# Patient Record
Sex: FEMALE | Race: OTHER | NOT HISPANIC OR LATINO
[De-identification: names, ages, dates, MRNs, and addresses within clinical notes are randomized per-mention and may not be internally consistent; named-entity substitution may affect disease eponyms.]

---

## 2022-09-16 ENCOUNTER — NON-APPOINTMENT (OUTPATIENT)
Age: 27
End: 2022-09-16

## 2022-09-16 ENCOUNTER — APPOINTMENT (OUTPATIENT)
Dept: CARDIOLOGY | Facility: CLINIC | Age: 27
End: 2022-09-16

## 2022-09-16 VITALS
WEIGHT: 176 LBS | BODY MASS INDEX: 30.05 KG/M2 | HEART RATE: 106 BPM | RESPIRATION RATE: 16 BRPM | DIASTOLIC BLOOD PRESSURE: 101 MMHG | SYSTOLIC BLOOD PRESSURE: 138 MMHG | HEIGHT: 64 IN | OXYGEN SATURATION: 99 %

## 2022-09-16 VITALS — DIASTOLIC BLOOD PRESSURE: 70 MMHG | SYSTOLIC BLOOD PRESSURE: 130 MMHG

## 2022-09-16 DIAGNOSIS — F41.9 ANXIETY DISORDER, UNSPECIFIED: ICD-10-CM

## 2022-09-16 DIAGNOSIS — G43.909 MIGRAINE, UNSPECIFIED, NOT INTRACTABLE, W/OUT STATUS MIGRAINOSUS: ICD-10-CM

## 2022-09-16 DIAGNOSIS — Z78.9 OTHER SPECIFIED HEALTH STATUS: ICD-10-CM

## 2022-09-16 DIAGNOSIS — R00.2 PALPITATIONS: ICD-10-CM

## 2022-09-16 DIAGNOSIS — F32.A DEPRESSION, UNSPECIFIED: ICD-10-CM

## 2022-09-16 DIAGNOSIS — F90.9 ATTENTION-DEFICIT HYPERACTIVITY DISORDER, UNSPECIFIED TYPE: ICD-10-CM

## 2022-09-16 DIAGNOSIS — R00.0 TACHYCARDIA, UNSPECIFIED: ICD-10-CM

## 2022-09-16 DIAGNOSIS — Z82.49 FAMILY HISTORY OF ISCHEMIC HEART DISEASE AND OTHER DISEASES OF THE CIRCULATORY SYSTEM: ICD-10-CM

## 2022-09-16 PROCEDURE — 99205 OFFICE O/P NEW HI 60 MIN: CPT

## 2022-09-16 PROCEDURE — 93000 ELECTROCARDIOGRAM COMPLETE: CPT

## 2022-09-16 RX ORDER — PSYLLIUM HUSK 0.4 G
CAPSULE ORAL
Refills: 0 | Status: ACTIVE | COMMUNITY

## 2022-09-16 RX ORDER — VALSARTAN 160 MG/1
160 TABLET, COATED ORAL DAILY
Refills: 0 | Status: ACTIVE | COMMUNITY

## 2022-09-16 RX ORDER — ATOGEPANT 60 MG/1
60 TABLET ORAL
Refills: 0 | Status: ACTIVE | COMMUNITY

## 2022-09-16 RX ORDER — VITAMIN B COMPLEX
CAPSULE ORAL
Refills: 0 | Status: ACTIVE | COMMUNITY

## 2022-09-16 RX ORDER — LEVOMILNACIPRAN HYDROCHLORIDE 40 MG/1
40 CAPSULE, EXTENDED RELEASE ORAL
Refills: 0 | Status: ACTIVE | COMMUNITY

## 2022-09-16 RX ORDER — CHROMIUM 200 MCG
TABLET ORAL
Refills: 0 | Status: ACTIVE | COMMUNITY

## 2022-09-16 NOTE — HISTORY OF PRESENT ILLNESS
[FreeTextEntry1] : 27F with hx of depression on fatzima, HTN on ARB, who is referred by the PMD for management of high blood pressure. Patient describes that since she started taking her antidepressant medication her BP readings have become elevated to the point that her PMD decided to Rx Valsartan. She has tried several antidepressants in the past but with fatzima her symptoms have improved. \par She is currently dealing with anxiety and stress in her personal life and she believes that contributes with her elevated BP readings. \par Patient denies chest pain, no palpitations, no PRABHAKAR, no PND, no orthopnea, no leg edema,  no claudication, no syncope.\par

## 2022-09-16 NOTE — ASSESSMENT
[FreeTextEntry1] : 27F with hx of depression on fatzima, HTN on ARB, who is referred by the PMD for management of high blood pressure. Patient describes that since she started taking her antidepressant medication her BP readings have become elevated to the point that her PMD decided to Rx Valsartan. She has tried several antidepressants in the past but with fatzima her symptoms have improved. \par She is currently dealing with anxiety and stress in her personal life and she believes that contributes with her elevated BP readings. \par Patient denies chest pain, no palpitations, no PRABHAKAR, no PND, no orthopnea, no leg edema,  no claudication, no syncope.\par \par \par #HTN\par at the office 130/70\par Patient instructed to monitor BP at home and bring log to f/u.\par elevated BP likely related to the use of her antidepressant medication, adverse effects involving HTN and tachycardia around 1%\par RTC in 4 weeks\par \par #anxiety \par patient instructed to work with her therapist in strategies to reduce stress, which could increase her BP\par \par I appreciate the opportunity of working with you in the care of ASHA SANCHEZ . If I may be of additional assistance, please do not hesitate to contact me. \par \par \par

## 2022-10-13 ENCOUNTER — APPOINTMENT (OUTPATIENT)
Dept: CARDIOLOGY | Facility: CLINIC | Age: 27
End: 2022-10-13

## 2022-10-13 VITALS
BODY MASS INDEX: 30.05 KG/M2 | OXYGEN SATURATION: 99 % | SYSTOLIC BLOOD PRESSURE: 140 MMHG | WEIGHT: 176 LBS | DIASTOLIC BLOOD PRESSURE: 94 MMHG | RESPIRATION RATE: 16 BRPM | HEART RATE: 109 BPM | HEIGHT: 64 IN

## 2022-10-13 DIAGNOSIS — I10 ESSENTIAL (PRIMARY) HYPERTENSION: ICD-10-CM

## 2022-10-13 PROCEDURE — 99072 ADDL SUPL MATRL&STAF TM PHE: CPT

## 2022-10-13 PROCEDURE — 99214 OFFICE O/P EST MOD 30 MIN: CPT

## 2022-10-13 NOTE — HISTORY OF PRESENT ILLNESS
[FreeTextEntry1] : 27F with hx of depression on fatzima, HTN on ARB, who was last time seen in the office on 9/16, at that time patient was referred by the PMD for management of high blood pressure. Patient described that since she started taking her antidepressant medication her BP readings have become elevated to the point that her PMD decided to Rx Valsartan. She has tried several antidepressants in the past but with fatzima her symptoms have improved. \par She continues to complain about dealing with anxiety and stress in her personal life and she believes that contributes with her elevated BP readings. \par Patient denies chest pain, no palpitations, no PRABHAKAR, no PND, no orthopnea, no leg edema, no claudication, no syncope

## 2022-10-13 NOTE — ASSESSMENT
[FreeTextEntry1] : 27F with hx of depression on fatzima, HTN on ARB, who was last time seen in the office on 9/16, at that time patient was referred by the PMD for management of high blood pressure. Patient described that since she started taking her antidepressant medication her BP readings have become elevated to the point that her PMD decided to Rx Valsartan. She has tried several antidepressants in the past but with fatzima her symptoms have improved. \par Patient reports feeling less stressed, recently got engaged. \par Patient denies chest pain, no palpitations, no PRABHAKAR, no PND, no orthopnea, no leg edema, no claudication, no syncope\par \par #HTN \par patient with logs at home with readings at goal \par continue current regimen \par Patient instructed to monitor BP at home and bring log to f/u.\par RTC in 6 months

## 2023-02-03 ENCOUNTER — OFFICE (OUTPATIENT)
Dept: URBAN - METROPOLITAN AREA CLINIC 104 | Facility: CLINIC | Age: 28
Setting detail: OPHTHALMOLOGY
End: 2023-02-03
Payer: COMMERCIAL

## 2023-02-03 DIAGNOSIS — H43.811: ICD-10-CM

## 2023-02-03 DIAGNOSIS — H44.23: ICD-10-CM

## 2023-02-03 DIAGNOSIS — H35.133: ICD-10-CM

## 2023-02-03 PROCEDURE — 92014 COMPRE OPH EXAM EST PT 1/>: CPT | Performed by: OPTOMETRIST

## 2023-02-03 ASSESSMENT — REFRACTION_CURRENTRX
OS_OVR_VA: 20/
OD_AXIS: 19
OS_AXIS: 180
OD_OVR_VA: 20/
OS_SPHERE: -13.00
OD_CYLINDER: -2.75
OD_SPHERE: -12.25
OS_CYLINDER: -1.50

## 2023-02-03 ASSESSMENT — REFRACTION_AUTOREFRACTION
OS_AXIS: 17
OS_SPHERE: -17.25
OS_CYLINDER: -1.50
OD_AXIS: 4
OD_SPHERE: -18.25
OD_CYLINDER: -2.00

## 2023-02-03 ASSESSMENT — TONOMETRY: OD_IOP_MMHG: 15

## 2023-02-03 ASSESSMENT — CONFRONTATIONAL VISUAL FIELD TEST (CVF)
OD_FINDINGS: FULL
OS_FINDINGS: FULL

## 2023-02-03 ASSESSMENT — SPHEQUIV_DERIVED
OS_SPHEQUIV: -18
OD_SPHEQUIV: -19.25

## 2023-02-03 ASSESSMENT — VISUAL ACUITY
OD_BCVA: 20/30
OS_BCVA: 20/30

## 2023-03-02 ENCOUNTER — OFFICE (OUTPATIENT)
Dept: URBAN - METROPOLITAN AREA CLINIC 94 | Facility: CLINIC | Age: 28
Setting detail: OPHTHALMOLOGY
End: 2023-03-02
Payer: COMMERCIAL

## 2023-03-02 DIAGNOSIS — H43.811: ICD-10-CM

## 2023-03-02 DIAGNOSIS — H35.133: ICD-10-CM

## 2023-03-02 DIAGNOSIS — H44.23: ICD-10-CM

## 2023-03-02 PROCEDURE — 92250 FUNDUS PHOTOGRAPHY W/I&R: CPT | Performed by: SPECIALIST

## 2023-03-02 PROCEDURE — 92014 COMPRE OPH EXAM EST PT 1/>: CPT | Performed by: SPECIALIST

## 2023-03-02 ASSESSMENT — REFRACTION_CURRENTRX
OD_OVR_VA: 20/
OD_AXIS: 19
OS_SPHERE: -13.00
OS_AXIS: 180
OD_CYLINDER: -2.75
OS_OVR_VA: 20/
OS_CYLINDER: -1.50
OD_SPHERE: -12.25

## 2023-03-02 ASSESSMENT — REFRACTION_AUTOREFRACTION
OD_SPHERE: -18.25
OD_CYLINDER: -2.00
OD_AXIS: 4
OS_CYLINDER: -1.50
OS_SPHERE: -17.25
OS_AXIS: 17

## 2023-03-02 ASSESSMENT — VISUAL ACUITY
OD_BCVA: 20/40
OS_BCVA: 20/40

## 2023-03-02 ASSESSMENT — SPHEQUIV_DERIVED
OD_SPHEQUIV: -19.25
OS_SPHEQUIV: -18

## 2023-03-02 ASSESSMENT — TONOMETRY
OS_IOP_MMHG: 17
OD_IOP_MMHG: 16

## 2023-03-02 ASSESSMENT — CONFRONTATIONAL VISUAL FIELD TEST (CVF)
OD_FINDINGS: FULL
OS_FINDINGS: FULL